# Patient Record
(demographics unavailable — no encounter records)

---

## 2024-10-07 NOTE — HISTORY OF PRESENT ILLNESS
[FreeTextEntry1] : Ms. Adorno is a 68 y/o F who presents to the office for follow up on pelvic prolapse.  POP is supported by a RS #4.  She is happy with the support the pessary is providing.  She is urinating and moving her bowels without problems.  No abdominopelvic pain.  She performs her own pessary care every week.  She reports seeing the occasional streak of blood after removing the pessary while performing pessary care.  She does not use Replens or Estrogen cream.  She uses KY Jelly while performing pessary care.   Of note,   TVUS on 5/9/2024 showed normal thickness endometrium with cystic changes.  She is now s/p hysteroscopy D&C on 9/20/24 with Dr. Guadalupe.  Endometrium curettage did not identify any endometrial glands.

## 2024-10-07 NOTE — PHYSICAL EXAM
[No Acute Distress] : in no acute distress [Well developed] : well developed [Well Nourished] : ~L well nourished [Good Hygeine] : demonstrates good hygeine [Oriented x3] : oriented to person, place, and time [Normal Memory] : ~T memory was ~L unimpaired [Normal Mood/Affect] : mood and affect are normal [Warm and Dry] : was warm and dry to touch [Normal Gait] : gait was normal [Vulvar Atrophy] : vulvar atrophy [Labia Majora] : were normal [Labia Minora] : were normal [Atrophy] : atrophy [Chaperone Present] : A chaperone was present in the examining room during all aspects of the physical examination [95863] : A chaperone was present during the pelvic exam. [Erythematous] : erythema [Discharge] : a  ~M vaginal discharge was present [Ivelisse] : yellow [Thin] : thin [Scant] : there was scant vaginal bleeding [Normal] : normal [FreeTextEntry2] : PRATIMA Ortega Student [Anxiety] : patient is not anxious [FreeTextEntry4] : scant blood on scopette during exam after psc removal

## 2024-10-07 NOTE — DISCUSSION/SUMMARY
[FreeTextEntry1] : #Pelvic prolapse: - Continue with RS #4 - Pt to continue weekly self-care.  The intermittent scant spotting she experiences is likely related to vaginal atrophy and trauma from pessary removal.  Advised transvaginal estrogen cream use versus Replens.  Haylee has a history of breast CA and prefers to use Replens. - Precaution and instructions were reviewed.    F/U 6 months or sooner if needed.  Instructed to call with any questions or concerns and she verbalizes understanding.

## 2024-10-07 NOTE — PROCEDURE
[Good Fit] : fits well [Erythema] : erythema noted [Discharge] : there is vaginal discharge [Pessary Inserted] : inserted [Pessary Washed] : washed [Mild] : mild bleeding [Medication Review] : Medicaiton Review: Patient verbalizes understanding of risks and benefits [Refit] : refit is not needed [Erosion] : no evidence of erosion [Infection] : no evidence of infection [FreeTextEntry1] : RS #4 [FreeTextEntry8] : Routine perineal care

## 2025-04-23 NOTE — HISTORY OF PRESENT ILLNESS
[FreeTextEntry1] : Ms. Adorno is a 71 y/o F who presents to the office for follow up on pelvic prolapse.  POP is supported by a RS #4.  She is happy with the support the pessary is providing.  She is urinating and moving her bowels without problems.  No abdominopelvic pain.  She performs her own pessary care every week.  She uses Replens 1-2 times a day.  She uses KY jelly while performing pessary self-care.  She continues to report the occasional streak of blood on the toilet paper.      Haylee spends her hough in [place].  She consulted a urologist concerned about the continued spotting.  She explains that she also discussed the bleeding with her GYN who was not concerned.  Per the  note, she was experiencing gross hematuria however the patient denies this.  She never saw blood in the toilet bowl, she only saw blood on the toilet paper.  This being said, Haylee underwent the full gross hematuria work-up.  Per  note on 3/11/25, straight cath UCX negative and UA with microscopy did not show any RBCs.  Cysto was normal with negative urine cytology.   note from 3/18/25 states that the CTU performed on 3/12/25 was negative without renal calculi or  tract pathology.  CTU report not available at this time.    Of note, Haylee had a TVUS on 5/9/2024 that showed normal thickness endometrium with cystic changes.  She is s/p hysteroscopy D&C on 9/20/24 with Dr. Guadalupe.  Endometrium curettage did not identify any endometrial glands. The patient states that Dr. Guadalupe is retiring and she is looking for a new general GYN.

## 2025-04-23 NOTE — DISCUSSION/SUMMARY
[FreeTextEntry1] : #Pelvic prolapse: - Continue with RS #4 - Pt to continue weekly self-care.  The intermittent scant spotting she experiences is likely related to vaginal atrophy as visualized on pelvic exam.  This being said, give her history of D&C, suggested the patient make an appointment with a general GYN to discuss interval TVUS.  She will make an appointment with [facility] on her way out of the office today.   - Pt asked to initiate transvaginal estrogen cream use.  Risks and benefits reviewed.  Patient states understanding and wishes to start treatment.  Rx sent to the pharmacy on file.  - Pt expressed interested in surgical management of her uterine prolapse. Advised she follow-up with Dr. Eaton to discuss the specifics.  She knows the psc needs to be left out for 10-14 days prior to exam by Dr. Eaton.  The patient will consider this and schedule an appointment with Dr. Eaton if she chooses to.  #Recent  work-up: -Requested she have a copy of the CTU from 3/12/25 faxed to this office for our records.  F/U 6 months or sooner if needed.  Instructed to call with any questions or concerns and she verbalizes understanding.

## 2025-04-23 NOTE — PHYSICAL EXAM
[MA] : MA [No Acute Distress] : in no acute distress [Well developed] : well developed [Well Nourished] : ~L well nourished [Good Hygeine] : demonstrates good hygeine [Oriented x3] : oriented to person, place, and time [Normal Memory] : ~T memory was ~L unimpaired [Normal Mood/Affect] : mood and affect are normal [Warm and Dry] : was warm and dry to touch [Normal Gait] : gait was normal [Vulvar Atrophy] : vulvar atrophy [Labia Majora] : were normal [Labia Minora] : were normal [Atrophy] : atrophy [Erythematous] : erythema [Discharge] : a  ~M vaginal discharge was present [Ivelises] : yellow [Thin] : thin [Scant] : there was scant vaginal bleeding [Normal] : normal [FreeTextEntry2] : Andres [Anxiety] : patient is not anxious [FreeTextEntry4] : scant blood on scopette during exam after psc removal

## 2025-05-19 NOTE — REASON FOR VISIT
[FreeTextEntry2] : 05/19/2025: 70-year-old female here today for: L hand pain. States her L thumb locks, and joint is painful. Has Hx of trigger finger. Had sx as well on R hand.

## 2025-05-19 NOTE — PHYSICAL EXAM
[de-identified] : L hand: Mild swelling thumb Tender 1st A1 pulley Decreased thumb ROM +thumb triggering

## 2025-05-19 NOTE — ASSESSMENT
[FreeTextEntry1] :  Trigger finger is a progressive problem that once occurs will be a chronic issue that will likely continue until surgical treatment is necessary. Trigger finger creates a chronic change to the tendon/pulley system in the hand that will be present until surgical release. Treatment options for trigger finger include OTC NSAIDS, prescription NSAIDS, ice, splinting, OT, cortisone injection, and surgical release.   I recommend the patient take over the counter medication such as Advil/Motrin/Aleve or Tylenol for pain and inflammation  L Thumb tendon sheath injection was performed at the A1 pulley because of pain and inflammation under aseptic conditions with betadine and alcohol Anesthesia: ethyl chloride sprayed topically Celestone 6mg/1cc: An injection of Celestone 1cc Lidocaine: An injection of Lidocaine 1% 1cc Marcaine: An injection of Marcaine 0.5% 1cc 25G needle   The risks, benefits, and alternatives to cortisone injection were explained in full to the patient. Risks outlined include but are not limited to infection, sepsis, bleeding, scarring, skin discoloration, temporary increase in pain, syncopal episode, failure to resolve symptoms, allergic reaction, symptom recurrence, and elevation of blood sugar in diabetics. Patient understood the risks. All questions were answered. After discussion of options, patient verbally consented to an injection. Sterile prep was done of the injection site. Patient tolerated the procedure well. Advised to ice the injection site this evening.    Return prn

## 2025-05-19 NOTE — HISTORY OF PRESENT ILLNESS
[5] : 5 [Dull/Aching] : dull/aching [de-identified] : L thumb locking and pain for a few weeks   [FreeTextEntry1] : L hand